# Patient Record
Sex: FEMALE | Race: WHITE | Employment: UNEMPLOYED | URBAN - METROPOLITAN AREA
[De-identification: names, ages, dates, MRNs, and addresses within clinical notes are randomized per-mention and may not be internally consistent; named-entity substitution may affect disease eponyms.]

---

## 2019-09-12 PROBLEM — M79.7 FIBROMYALGIA: Status: ACTIVE | Noted: 2019-09-12

## 2019-09-12 PROBLEM — G89.29 CHRONIC BILATERAL LOW BACK PAIN WITHOUT SCIATICA: Status: ACTIVE | Noted: 2019-09-12

## 2019-09-12 PROBLEM — E78.00 HYPERCHOLESTEROLEMIA: Status: ACTIVE | Noted: 2019-09-12

## 2019-09-12 PROBLEM — E66.01 OBESITY, MORBID (HCC): Status: ACTIVE | Noted: 2019-09-12

## 2019-09-12 PROBLEM — E11.42 DIABETIC POLYNEUROPATHY ASSOCIATED WITH TYPE 2 DIABETES MELLITUS (HCC): Status: ACTIVE | Noted: 2019-09-12

## 2019-09-12 PROBLEM — E03.9 HYPOTHYROIDISM: Status: ACTIVE | Noted: 2019-09-12

## 2019-09-12 PROBLEM — I10 ESSENTIAL HYPERTENSION: Status: ACTIVE | Noted: 2019-09-12

## 2019-09-12 PROBLEM — M54.50 CHRONIC BILATERAL LOW BACK PAIN WITHOUT SCIATICA: Status: ACTIVE | Noted: 2019-09-12

## 2019-09-12 PROBLEM — E11.65 UNCONTROLLED TYPE 2 DIABETES MELLITUS WITH HYPERGLYCEMIA (HCC): Status: ACTIVE | Noted: 2019-09-12

## 2019-09-22 ENCOUNTER — HOSPITAL ENCOUNTER (OUTPATIENT)
Dept: SLEEP MEDICINE | Age: 49
Discharge: HOME | End: 2019-09-22
Payer: MEDICAID

## 2019-09-22 PROCEDURE — 95811 POLYSOM 6/>YRS CPAP 4/> PARM: CPT

## 2019-09-23 ENCOUNTER — HOSPITAL ENCOUNTER (OUTPATIENT)
Dept: GENERAL RADIOLOGY | Age: 49
Discharge: HOME | End: 2019-09-23
Payer: MEDICAID

## 2019-09-23 DIAGNOSIS — M53.3 COCCYX PAIN: ICD-10-CM

## 2019-09-23 PROCEDURE — 72220 X-RAY EXAM SACRUM TAILBONE: CPT

## 2019-09-23 NOTE — PROGRESS NOTES
Xray of tailbone is negative - no fractures  Dr. Tina Dejesus  Results released to [employer] with comments

## 2019-09-30 PROBLEM — G47.33 OSA (OBSTRUCTIVE SLEEP APNEA): Status: ACTIVE | Noted: 2019-09-30

## 2022-05-16 ENCOUNTER — OFFICE VISIT (OUTPATIENT)
Dept: INTERNAL MEDICINE | Facility: CLINIC | Age: 52
End: 2022-05-16

## 2022-05-16 VITALS
SYSTOLIC BLOOD PRESSURE: 158 MMHG | HEART RATE: 88 BPM | HEIGHT: 62 IN | DIASTOLIC BLOOD PRESSURE: 70 MMHG | BODY MASS INDEX: 53.92 KG/M2 | TEMPERATURE: 97.7 F | WEIGHT: 293 LBS | OXYGEN SATURATION: 98 %

## 2022-05-16 DIAGNOSIS — J44.9 CHRONIC OBSTRUCTIVE PULMONARY DISEASE, UNSPECIFIED COPD TYPE: ICD-10-CM

## 2022-05-16 DIAGNOSIS — M79.662 PAIN OF LEFT CALF: Primary | ICD-10-CM

## 2022-05-16 PROCEDURE — 99203 OFFICE O/P NEW LOW 30 MIN: CPT | Performed by: NURSE PRACTITIONER

## 2022-05-16 RX ORDER — TIZANIDINE 4 MG/1
TABLET ORAL
COMMUNITY
Start: 2022-02-26

## 2022-05-16 RX ORDER — LANCETS
EACH MISCELLANEOUS
COMMUNITY
Start: 2022-04-04

## 2022-05-16 RX ORDER — GLIPIZIDE 10 MG/1
TABLET ORAL
COMMUNITY
Start: 2022-04-27

## 2022-05-16 RX ORDER — TIZANIDINE 4 MG/1
4 TABLET ORAL DAILY
COMMUNITY
Start: 2022-02-21 | End: 2022-05-16 | Stop reason: SDUPTHER

## 2022-05-16 RX ORDER — LEVOTHYROXINE SODIUM 0.1 MG/1
100 TABLET ORAL DAILY
COMMUNITY
Start: 2022-04-28 | End: 2022-05-16 | Stop reason: ALTCHOICE

## 2022-05-16 RX ORDER — BLOOD SUGAR DIAGNOSTIC
STRIP MISCELLANEOUS
COMMUNITY
Start: 2022-05-03

## 2022-05-16 RX ORDER — LEVOTHYROXINE SODIUM 0.12 MG/1
125 TABLET ORAL DAILY
COMMUNITY
Start: 2022-04-06

## 2022-05-16 RX ORDER — ATORVASTATIN CALCIUM 20 MG/1
20 TABLET, FILM COATED ORAL DAILY
COMMUNITY
Start: 2022-03-23

## 2022-05-16 RX ORDER — LOSARTAN POTASSIUM 25 MG/1
TABLET ORAL
COMMUNITY
Start: 2022-04-27

## 2022-05-16 RX ORDER — LEVOTHYROXINE SODIUM 0.07 MG/1
75 TABLET ORAL
COMMUNITY
End: 2022-05-16 | Stop reason: ALTCHOICE

## 2022-05-16 RX ORDER — ALBUTEROL SULFATE 90 UG/1
2 AEROSOL, METERED RESPIRATORY (INHALATION) EVERY 6 HOURS PRN
Qty: 6.7 G | Refills: 0 | Status: SHIPPED | OUTPATIENT
Start: 2022-05-16

## 2022-05-16 RX ORDER — SPIRONOLACTONE 100 MG/1
100 TABLET, FILM COATED ORAL DAILY
COMMUNITY
Start: 2022-04-27

## 2022-05-16 RX ORDER — GABAPENTIN 800 MG/1
800 TABLET ORAL 3 TIMES DAILY
COMMUNITY
Start: 2022-04-30

## 2022-05-16 RX ORDER — SITAGLIPTIN 25 MG/1
TABLET, FILM COATED ORAL
COMMUNITY
Start: 2022-04-28 | End: 2022-05-16 | Stop reason: SDUPTHER

## 2022-05-16 RX ORDER — LISINOPRIL 20 MG/1
25 TABLET ORAL DAILY
COMMUNITY
End: 2022-05-16 | Stop reason: ALTCHOICE

## 2022-05-16 RX ORDER — LOSARTAN POTASSIUM 25 MG/1
25 TABLET ORAL DAILY
COMMUNITY
Start: 2022-04-04

## 2022-05-16 RX ORDER — DULOXETIN HYDROCHLORIDE 30 MG/1
30 CAPSULE, DELAYED RELEASE ORAL DAILY
Qty: 30 CAPSULE | Refills: 0 | Status: CANCELLED | OUTPATIENT
Start: 2022-05-16

## 2022-05-17 ENCOUNTER — HOSPITAL ENCOUNTER (OUTPATIENT)
Dept: ULTRASOUND IMAGING | Facility: HOSPITAL | Age: 52
Discharge: HOME | End: 2022-05-17
Admitting: NURSE PRACTITIONER

## 2022-05-17 PROCEDURE — 93971 EXTREMITY STUDY: CPT

## 2024-04-29 NOTE — PROGRESS NOTES
"  Office Visit      Patient Name: Lucy Morgan  : 1970   MRN: [id number]   Care Team: Patient Care Team:  Génesis العلي APRN as PCP - General (Family Medicine)    Chief Complaint  Establish Care (L leg, knot behind/below knee, pain down through 3 middle toes; states that Lyrica and a similar medication do no work for her.; most recent A1C 7.7  in April)    Subjective     Subjective      Lucy Morgan presents to [facility] to establish care and left leg pain. Onset 2 weeks. Location: left leg behind knee. Patient states she feels a small knot below the back of knee. Nature: sharp. No alleviating factors; tried NSAIDs, tylenol, tramadol, lidocaine, gabapentin without relief. Aggravating factors: walking on leg. Recent travel driving, more sedentary. Patient states spot on leg is not red, but is does feel warm/hot at times when up walking on it. No history of DVT or blood clots. Reports mother has history of DVT.     Review of Systems   Constitutional: Negative for chills, fatigue and fever.   Respiratory: Negative for chest tightness, shortness of breath and wheezing.    Cardiovascular: Positive for leg swelling. Negative for chest pain and palpitations.   Musculoskeletal: Positive for gait problem and myalgias.   Skin: Negative for color change and bruise.   Neurological: Negative for headache and confusion.   All other systems reviewed and are negative.      Health Maintenance   Topic Date Due   • URINE MICROALBUMIN  Never done   • ZOSTER VACCINE (1 of 2) Never done   • DIABETIC FOOT EXAM  Never done   • PAP SMEAR  Never done   • DIABETIC EYE EXAM  Never done   • INFLUENZA VACCINE  2022   • HEMOGLOBIN A1C  10/04/2022   • MAMMOGRAM  2023   • TDAP/TD VACCINES (2 - Td or Tdap) 2032       Objective     Objective   Vital Signs:   /70   Pulse 88   Temp 97.7 °F (36.5 °C) (Temporal)   Ht 157.5 cm (62\")   Wt 134 kg (296 lb)   SpO2 98%   BMI 54.14 kg/m²   "   Physical Exam  Constitutional:       Appearance: Normal appearance. She is obese.   Cardiovascular:      Rate and Rhythm: Normal rate and regular rhythm.   Pulmonary:      Effort: Pulmonary effort is normal.      Breath sounds: Normal breath sounds.   Musculoskeletal:      Cervical back: Neck supple.      Right lower leg: Normal. No edema.      Left lower leg: Normal. No edema.        Legs:       Comments: Tenderness to palpation, no erythema, warmth or edema    Lymphadenopathy:      Cervical: No cervical adenopathy.   Neurological:      Mental Status: She is alert and oriented to person, place, and time.   Psychiatric:         Mood and Affect: Mood normal.          Assessment / Plan      Assessment & Plan   Problem List Items Addressed This Visit    None     Visit Diagnoses     Pain of left calf    -  Primary    Relevant Medications    Diclofenac Sodium (Voltaren) 1 % gel gel    Other Relevant Orders    US venous doppler lower extremity left (duplex)        Chronic obstructive pulmonary disease, unspecified COPD type (HCC)        Relevant Medications    albuterol sulfate  (90 Base) MCG/ACT inhaler           Discussion Summary:  - Will order stat US left lower extremity to rule out DVT; differentials discussed. May be more muscular or bakers cyst causing pain. Patient has appointment with ortho back in [place] home [place] and will follow up with them once returning. Will send voltaren gel to use on site prn to see if pain relief since other medications and methods did not work. To ER if worsening pain, redness, edema, chest pain, SOA.       Follow Up   No follow-ups on file.  Patient was given instructions and counseling regarding her condition or for health maintenance advice. Please see specific information pulled into the AVS if appropriate.     SHALOM Santana  [facility]     Discharged